# Patient Record
(demographics unavailable — no encounter records)

---

## 2024-10-14 NOTE — DEVELOPMENTAL MILESTONES
[Normal Development] : Normal Development [None] : none [Is dry day and night] : is dry day and night [Chooses preferred foods] : chooses preferred foods [Starts/continues conversation with peers] : starts/continues conversation with peers [Tells a story with a beginning,] : tells a story with a beginning, a middle, and an end [Masters all consonant sounds and] : masters all consonant sounds and combinations, such as "d" or "ch" [Counts 10 objects] : counts 10 objects [Can do simple addition and] : can do simple addition and subtraction with objects [Hops on one foot 3 to 4 times] : hops on one foot 3 to 4 times [Catches small ball with] : catches small ball with 2 hands [Writes first and last name in] : writes first and last name in uppercase or lowercase letters [Draw a 12-part person] : draw a 12-part person [Prints 3 or more simple words] : prints 3 or more simple words without copying [Rides a standard bike] : does not ride a standard bike [FreeTextEntry1] : +training wheels

## 2024-10-14 NOTE — HISTORY OF PRESENT ILLNESS
[Parents] : parents [whole ___ oz/d] : consumes [unfilled] oz of whole milk per day [Fruit] : fruit [Vegetables] : vegetables [Meat] : meat [Eggs] : eggs [Dairy] : dairy [Toilet Trained] : toilet trained [Normal] : Normal [Brushing teeth] : Brushing teeth [Yes] : Patient goes to dentist yearly [Toothpaste] : Primary Fluoride Source: Toothpaste [Playtime (60 min/d)] : Playtime 60 min a day [Appropiate parent-child-sibling interaction] : Appropriate parent-child-sibling interaction [Child Cooperates] : Child cooperates [Parent has appropriate responses to behavior] : Parent has appropriate responses to behavior [Grade ___] : Grade [unfilled] [No difficulties with Homework] : No difficulties with homework [Adequate performance] : Adequate performance [Adequate attention] : Adequate attention [No] : Not at  exposure [Water heater temperature set at <120 degrees F] : Water heater temperature set at <120 degrees F [Car seat in back seat] : Car seat in back seat [Carbon Monoxide Detectors] : Carbon monoxide detectors [Smoke Detectors] : Smoke detectors [Supervised outdoor play] : Supervised outdoor play [Exposure to electronic nicotine delivery system] : Exposure to electronic nicotine delivery system [Up to date] : Up to date [FreeTextEntry7] : [facility] 6 years  [FreeTextEntry9] : football, soccer, corine mckin[employer] do

## 2024-10-14 NOTE — DISCUSSION/SUMMARY
[FreeTextEntry1] : 6 year old male presents for [facility]. Appears clinically well. No growth, developmental, elimination, feeding, skin and sleep concerns.  Continue balanced diet with all food groups. Brush teeth twice a day with toothbrush. Recommend visit to dentist. Help child to maintain consistent daily routines and sleep schedule. School discussed. Ensure home is safe. Teach child about personal safety. Use consistent, positive discipline. Limit screen time to no more than 2 hours per day. Encourage physical activity. Child needs to ride in a belt-positioning booster seat until  4 feet 9 inches has been reached and are between 8 and 12 years of age.     Return 1 year for routine well child check.

## 2025-05-08 NOTE — HISTORY OF PRESENT ILLNESS
[FreeTextEntry6] : Buttocks/ anus itchy Bath nightly No known exposures Used special soap last night, no improvement Using aquaphor NO URI  Eating/ drinking well NO diarrhea, large stools No vomiting